# Patient Record
Sex: MALE | Race: WHITE | NOT HISPANIC OR LATINO
[De-identification: names, ages, dates, MRNs, and addresses within clinical notes are randomized per-mention and may not be internally consistent; named-entity substitution may affect disease eponyms.]

---

## 2017-10-16 ENCOUNTER — APPOINTMENT (OUTPATIENT)
Dept: ENDOCRINOLOGY | Facility: CLINIC | Age: 52
End: 2017-10-16
Payer: MEDICARE

## 2017-10-16 VITALS
SYSTOLIC BLOOD PRESSURE: 135 MMHG | HEART RATE: 65 BPM | DIASTOLIC BLOOD PRESSURE: 81 MMHG | WEIGHT: 303 LBS | BODY MASS INDEX: 43.38 KG/M2 | HEIGHT: 70 IN

## 2017-10-16 DIAGNOSIS — Z86.39 PERSONAL HISTORY OF OTHER ENDOCRINE, NUTRITIONAL AND METABOLIC DISEASE: ICD-10-CM

## 2017-10-16 DIAGNOSIS — Z86.79 PERSONAL HISTORY OF OTHER DISEASES OF THE CIRCULATORY SYSTEM: ICD-10-CM

## 2017-10-16 DIAGNOSIS — M17.10 UNILATERAL PRIMARY OSTEOARTHRITIS, UNSPECIFIED KNEE: ICD-10-CM

## 2017-10-16 PROCEDURE — 99204 OFFICE O/P NEW MOD 45 MIN: CPT | Mod: 25

## 2017-10-16 PROCEDURE — 36415 COLL VENOUS BLD VENIPUNCTURE: CPT

## 2017-11-02 ENCOUNTER — APPOINTMENT (OUTPATIENT)
Dept: ENDOCRINOLOGY | Facility: CLINIC | Age: 52
End: 2017-11-02

## 2017-11-03 ENCOUNTER — APPOINTMENT (OUTPATIENT)
Dept: ENDOCRINOLOGY | Facility: CLINIC | Age: 52
End: 2017-11-03
Payer: MEDICARE

## 2017-11-03 PROCEDURE — 90834 PSYTX W PT 45 MINUTES: CPT

## 2017-11-07 ENCOUNTER — APPOINTMENT (OUTPATIENT)
Dept: ENDOCRINOLOGY | Facility: CLINIC | Age: 52
End: 2017-11-07

## 2017-11-08 ENCOUNTER — APPOINTMENT (OUTPATIENT)
Dept: ENDOCRINOLOGY | Facility: CLINIC | Age: 52
End: 2017-11-08

## 2017-11-13 ENCOUNTER — APPOINTMENT (OUTPATIENT)
Dept: ENDOCRINOLOGY | Facility: CLINIC | Age: 52
End: 2017-11-13
Payer: MEDICARE

## 2017-11-13 PROCEDURE — 90834 PSYTX W PT 45 MINUTES: CPT

## 2017-11-15 ENCOUNTER — APPOINTMENT (OUTPATIENT)
Dept: ENDOCRINOLOGY | Facility: CLINIC | Age: 52
End: 2017-11-15

## 2017-11-22 ENCOUNTER — APPOINTMENT (OUTPATIENT)
Dept: ENDOCRINOLOGY | Facility: CLINIC | Age: 52
End: 2017-11-22

## 2017-11-27 ENCOUNTER — APPOINTMENT (OUTPATIENT)
Dept: ENDOCRINOLOGY | Facility: CLINIC | Age: 52
End: 2017-11-27
Payer: MEDICARE

## 2017-11-27 PROCEDURE — 90834 PSYTX W PT 45 MINUTES: CPT

## 2017-12-05 ENCOUNTER — APPOINTMENT (OUTPATIENT)
Dept: ENDOCRINOLOGY | Facility: CLINIC | Age: 52
End: 2017-12-05

## 2017-12-12 ENCOUNTER — APPOINTMENT (OUTPATIENT)
Dept: ENDOCRINOLOGY | Facility: CLINIC | Age: 52
End: 2017-12-12

## 2018-01-18 ENCOUNTER — APPOINTMENT (OUTPATIENT)
Dept: ENDOCRINOLOGY | Facility: CLINIC | Age: 53
End: 2018-01-18
Payer: MEDICARE

## 2018-01-18 VITALS
HEART RATE: 72 BPM | SYSTOLIC BLOOD PRESSURE: 118 MMHG | HEIGHT: 70 IN | WEIGHT: 298.5 LBS | BODY MASS INDEX: 42.73 KG/M2 | DIASTOLIC BLOOD PRESSURE: 67 MMHG

## 2018-01-18 LAB — TSH SERPL-ACNC: 1.52 UIU/ML

## 2018-01-18 PROCEDURE — 99214 OFFICE O/P EST MOD 30 MIN: CPT

## 2018-01-29 ENCOUNTER — APPOINTMENT (OUTPATIENT)
Dept: ENDOCRINOLOGY | Facility: CLINIC | Age: 53
End: 2018-01-29
Payer: MEDICARE

## 2018-01-29 PROCEDURE — 90834 PSYTX W PT 45 MINUTES: CPT

## 2018-02-08 ENCOUNTER — APPOINTMENT (OUTPATIENT)
Dept: ENDOCRINOLOGY | Facility: CLINIC | Age: 53
End: 2018-02-08

## 2018-02-15 ENCOUNTER — APPOINTMENT (OUTPATIENT)
Dept: ENDOCRINOLOGY | Facility: CLINIC | Age: 53
End: 2018-02-15
Payer: MEDICARE

## 2018-02-15 PROCEDURE — 90834 PSYTX W PT 45 MINUTES: CPT

## 2018-02-26 ENCOUNTER — APPOINTMENT (OUTPATIENT)
Dept: ENDOCRINOLOGY | Facility: CLINIC | Age: 53
End: 2018-02-26
Payer: MEDICARE

## 2018-02-26 VITALS
HEART RATE: 74 BPM | SYSTOLIC BLOOD PRESSURE: 118 MMHG | BODY MASS INDEX: 42.18 KG/M2 | WEIGHT: 294 LBS | DIASTOLIC BLOOD PRESSURE: 69 MMHG

## 2018-02-26 DIAGNOSIS — E66.9 OBESITY, UNSPECIFIED: ICD-10-CM

## 2018-02-26 PROCEDURE — 99214 OFFICE O/P EST MOD 30 MIN: CPT

## 2018-03-09 LAB
ALBUMIN SERPL ELPH-MCNC: 4.6 G/DL
ALP BLD-CCNC: 73 U/L
ALT SERPL-CCNC: 18 U/L
ANION GAP SERPL CALC-SCNC: 17 MMOL/L
AST SERPL-CCNC: 19 U/L
BILIRUB SERPL-MCNC: 0.5 MG/DL
BUN SERPL-MCNC: 8 MG/DL
CALCIUM SERPL-MCNC: 10.1 MG/DL
CHLORIDE SERPL-SCNC: 100 MMOL/L
CHOLEST SERPL-MCNC: 184 MG/DL
CHOLEST/HDLC SERPL: 2.6 RATIO
CO2 SERPL-SCNC: 29 MMOL/L
CREAT SERPL-MCNC: 1.04 MG/DL
CREAT SPEC-SCNC: 94 MG/DL
GLUCOSE SERPL-MCNC: 111 MG/DL
HBA1C MFR BLD HPLC: 6.2 %
HDLC SERPL-MCNC: 71 MG/DL
LDLC SERPL CALC-MCNC: 103 MG/DL
MICROALBUMIN 24H UR DL<=1MG/L-MCNC: 3 MG/DL
MICROALBUMIN/CREAT 24H UR-RTO: 32 MG/G
POTASSIUM SERPL-SCNC: 3.9 MMOL/L
PROT SERPL-MCNC: 7.4 G/DL
SODIUM SERPL-SCNC: 146 MMOL/L
TRIGL SERPL-MCNC: 51 MG/DL
TSH SERPL-ACNC: 0.86 UIU/ML

## 2018-03-22 ENCOUNTER — APPOINTMENT (OUTPATIENT)
Dept: ENDOCRINOLOGY | Facility: CLINIC | Age: 53
End: 2018-03-22

## 2018-03-26 ENCOUNTER — APPOINTMENT (OUTPATIENT)
Dept: ENDOCRINOLOGY | Facility: CLINIC | Age: 53
End: 2018-03-26

## 2018-04-16 ENCOUNTER — APPOINTMENT (OUTPATIENT)
Dept: ENDOCRINOLOGY | Facility: CLINIC | Age: 53
End: 2018-04-16
Payer: MEDICARE

## 2018-04-16 VITALS
HEIGHT: 70 IN | SYSTOLIC BLOOD PRESSURE: 133 MMHG | BODY MASS INDEX: 41.23 KG/M2 | DIASTOLIC BLOOD PRESSURE: 83 MMHG | WEIGHT: 288 LBS | HEART RATE: 82 BPM

## 2018-04-16 DIAGNOSIS — R73.09 OTHER ABNORMAL GLUCOSE: ICD-10-CM

## 2018-04-16 PROCEDURE — 99213 OFFICE O/P EST LOW 20 MIN: CPT

## 2018-04-20 ENCOUNTER — APPOINTMENT (OUTPATIENT)
Dept: OTOLARYNGOLOGY | Facility: CLINIC | Age: 53
End: 2018-04-20
Payer: MEDICARE

## 2018-04-20 VITALS
SYSTOLIC BLOOD PRESSURE: 126 MMHG | DIASTOLIC BLOOD PRESSURE: 77 MMHG | WEIGHT: 288 LBS | HEIGHT: 70 IN | HEART RATE: 86 BPM | BODY MASS INDEX: 41.23 KG/M2

## 2018-04-20 DIAGNOSIS — F41.9 ANXIETY DISORDER, UNSPECIFIED: ICD-10-CM

## 2018-04-20 DIAGNOSIS — G47.33 OBSTRUCTIVE SLEEP APNEA (ADULT) (PEDIATRIC): ICD-10-CM

## 2018-04-20 DIAGNOSIS — J34.2 DEVIATED NASAL SEPTUM: ICD-10-CM

## 2018-04-20 DIAGNOSIS — F32.9 ANXIETY DISORDER, UNSPECIFIED: ICD-10-CM

## 2018-04-20 PROCEDURE — 31231 NASAL ENDOSCOPY DX: CPT

## 2018-04-20 PROCEDURE — 99214 OFFICE O/P EST MOD 30 MIN: CPT | Mod: 25

## 2018-04-20 RX ORDER — MOMETASONE 50 UG/1
50 SPRAY, METERED NASAL TWICE DAILY
Qty: 6 | Refills: 3 | Status: ACTIVE | COMMUNITY
Start: 2018-04-20 | End: 1900-01-01

## 2018-04-20 RX ORDER — DULOXETINE HYDROCHLORIDE 30 MG/1
30 CAPSULE, DELAYED RELEASE ORAL
Refills: 0 | Status: ACTIVE | COMMUNITY

## 2018-04-20 RX ORDER — METFORMIN HYDROCHLORIDE 1000 MG/1
1000 TABLET, COATED ORAL
Qty: 180 | Refills: 3 | Status: DISCONTINUED | COMMUNITY
Start: 2018-02-26 | End: 2018-04-20

## 2018-04-21 PROBLEM — F41.9 ANXIETY AND DEPRESSION: Status: ACTIVE | Noted: 2018-04-21

## 2018-05-15 ENCOUNTER — RX RENEWAL (OUTPATIENT)
Age: 53
End: 2018-05-15

## 2018-05-15 ENCOUNTER — APPOINTMENT (OUTPATIENT)
Dept: ENDOCRINOLOGY | Facility: CLINIC | Age: 53
End: 2018-05-15
Payer: MEDICARE

## 2018-05-15 VITALS
DIASTOLIC BLOOD PRESSURE: 60 MMHG | WEIGHT: 285 LBS | SYSTOLIC BLOOD PRESSURE: 108 MMHG | BODY MASS INDEX: 40.89 KG/M2 | HEART RATE: 75 BPM

## 2018-05-15 DIAGNOSIS — R73.03 PREDIABETES.: ICD-10-CM

## 2018-05-15 PROCEDURE — 82962 GLUCOSE BLOOD TEST: CPT

## 2018-05-15 PROCEDURE — 99211 OFF/OP EST MAY X REQ PHY/QHP: CPT | Mod: 25

## 2018-05-15 RX ORDER — EXENATIDE 250 UG/ML
5 INJECTION SUBCUTANEOUS
Qty: 6 | Refills: 3 | Status: ACTIVE | COMMUNITY
Start: 2018-05-15 | End: 1900-01-01

## 2018-05-22 ENCOUNTER — OTHER (OUTPATIENT)
Age: 53
End: 2018-05-22

## 2018-05-23 ENCOUNTER — APPOINTMENT (OUTPATIENT)
Dept: PULMONOLOGY | Facility: CLINIC | Age: 53
End: 2018-05-23

## 2018-06-12 ENCOUNTER — APPOINTMENT (OUTPATIENT)
Dept: ENDOCRINOLOGY | Facility: CLINIC | Age: 53
End: 2018-06-12

## 2018-06-20 ENCOUNTER — APPOINTMENT (OUTPATIENT)
Dept: OTOLARYNGOLOGY | Facility: CLINIC | Age: 53
End: 2018-06-20

## 2018-08-30 RX ORDER — PEN NEEDLE, DIABETIC 29 G X1/2"
32G X 4 MM NEEDLE, DISPOSABLE MISCELLANEOUS
Qty: 1 | Refills: 2 | Status: ACTIVE | COMMUNITY
Start: 2018-05-15 | End: 1900-01-01

## 2021-04-26 ENCOUNTER — APPOINTMENT (OUTPATIENT)
Dept: VASCULAR SURGERY | Facility: CLINIC | Age: 56
End: 2021-04-26

## 2021-06-18 ENCOUNTER — APPOINTMENT (OUTPATIENT)
Dept: OTOLARYNGOLOGY | Facility: CLINIC | Age: 56
End: 2021-06-18
Payer: MEDICARE

## 2021-06-18 VITALS
WEIGHT: 280 LBS | HEIGHT: 70 IN | BODY MASS INDEX: 40.09 KG/M2 | TEMPERATURE: 97.3 F | HEART RATE: 68 BPM | SYSTOLIC BLOOD PRESSURE: 120 MMHG | DIASTOLIC BLOOD PRESSURE: 74 MMHG

## 2021-06-18 PROCEDURE — 99204 OFFICE O/P NEW MOD 45 MIN: CPT

## 2021-07-28 RX ORDER — AMOXICILLIN AND CLAVULANATE POTASSIUM 875; 125 MG/1; MG/1
875-125 TABLET, COATED ORAL
Qty: 42 | Refills: 0 | Status: COMPLETED | COMMUNITY
Start: 2018-04-20 | End: 2018-05-11

## 2021-07-28 RX ORDER — PREDNISONE 20 MG/1
20 TABLET ORAL DAILY
Qty: 3 | Refills: 0 | Status: COMPLETED | COMMUNITY
Start: 2018-04-20 | End: 2018-04-23

## 2021-07-28 NOTE — REVIEW OF SYSTEMS
[Patient Intake Form Reviewed] : Patient intake form was reviewed [As Noted in HPI] : as noted in HPI [Eyesight Problems] : eyesight problems [Vomiting] : vomiting [Joint Pain] : joint pain [Joint Swelling] : joint swelling [Joint Stiffness] : joint stiffness [Negative] : Endocrine [FreeTextEntry5] : heart murmur [de-identified] : imbalance [de-identified] : blood clots, anemia

## 2021-07-28 NOTE — ASSESSMENT
[FreeTextEntry1] : Mr. AKERS was evaluated for the following issues today:\par \par 1.) Nasal drainage thin, watery is chronic, likely just due to rhinitis\par --> start ipratropium bromide\par --> collect fluid to r/o CSF leak\par \par 2.) Nasal congestion chronic when lying down.  Inferior turbinates are small, so he may need more thick moisture in nose\par --> start saline gel spray\par \par Return in 1 month

## 2021-07-28 NOTE — DATA REVIEWED
[de-identified] : \par MRI BRAIN WITHOUT CONTRAST (12-) [facility]:\par -- The lateral and third ventricles and the cortical sulci are within normal limits in size and configuration for age. There is no evidence of a supratentorial mass or extra-axial fluid collection. No hemorrhages, cortical infarctions, acute infarcts or restricted diffusion are seen within the cerebral hemispheres bilaterally.\par -- In the posterior fossa, there are no abnormalities seen within the brainstem or cerebellum. The fourth ventricle is midline and within normal limits in size and configuration. No abnormalities are seen in the cerebellopontine angle cisterns or IACs bilaterally. The craniocervical junction is unremarkable.\par -- Normal flow void is seen within the skull base vessels. No significant abnormalities are seen within the orbits. \par -- Mild mucosal thickening with possible small air-fluid levels are noted within the maxillary antra compatible with mild inflammatory changes. Minimal mucosal thickening is seen within the ethmoid sinuses.\par

## 2021-07-28 NOTE — HISTORY OF PRESENT ILLNESS
[de-identified] : Mr. AKERS presents for nasal congestion\par Breathing is worse when he is lying down to sleep.\par About 2 weeks ago, after irrigation to right nasal cavity, he couldn’t breathing through right side until he coughed out a large mucus plug.  He is taking fluticasone nasal spray.\par He has thin clear rhinorrhea and postnasal drip.  He gets temporal headaches.\par S/p revision septoplasty, inferior turbinate reduction and endoscopic sinus surgery (maxillary, ethmoid, frontal) in 2016.\par Last seen in April 2018 and treated for acute sinusitis (air-fluid level on MRI brain 12/2017).\par

## 2021-07-28 NOTE — PROCEDURE
[FreeTextEntry6] : \par Indication: chronic sinusitis\par -Verbal consent was obtained from patient prior to exam. \par - Cristian-Synephrine and lidocaine 2% spray applied to nose bilaterally.\par Nasal endoscopy was performed with flexible scope.\par Findings: \par -- Inferior turbinates are small bilateral\par -- Septum was mildly deviated to the left\par -- Middle turbinates normal bilateral\par -- Middle meatus clear bilateral. Bilateral maxillary and ethmoid sinuses open. No d/c or polyps seen.\par -- Nasopharynx without mass or exudate\par -- Adenoids were absent\par -- Eustachian orifices were clear bilateral\par \par The patient tolerated the procedure well.\par

## 2021-07-28 NOTE — PHYSICAL EXAM
[Nasal Endoscopy Performed] : nasal endoscopy was performed, see procedure section for findings [Midline] : trachea located in midline position [Normal] : no rashes [de-identified] : Carotid pulses 2+ bilateral.

## 2021-08-04 ENCOUNTER — LABORATORY RESULT (OUTPATIENT)
Age: 56
End: 2021-08-04

## 2021-08-04 ENCOUNTER — APPOINTMENT (OUTPATIENT)
Dept: OTOLARYNGOLOGY | Facility: CLINIC | Age: 56
End: 2021-08-04
Payer: MEDICARE

## 2021-08-04 VITALS
WEIGHT: 285 LBS | HEART RATE: 71 BPM | TEMPERATURE: 97.3 F | HEIGHT: 70 IN | SYSTOLIC BLOOD PRESSURE: 131 MMHG | DIASTOLIC BLOOD PRESSURE: 77 MMHG | BODY MASS INDEX: 40.8 KG/M2

## 2021-08-04 DIAGNOSIS — J34.89 OTHER SPECIFIED DISORDERS OF NOSE AND NASAL SINUSES: ICD-10-CM

## 2021-08-04 DIAGNOSIS — J32.8 OTHER CHRONIC SINUSITIS: ICD-10-CM

## 2021-08-04 PROCEDURE — 99214 OFFICE O/P EST MOD 30 MIN: CPT

## 2021-08-10 RX ORDER — IPRATROPIUM BROMIDE 42 UG/1
0.06 SPRAY NASAL
Qty: 1 | Refills: 2 | Status: DISCONTINUED | COMMUNITY
Start: 2021-06-18 | End: 2021-08-04

## 2021-08-10 NOTE — ASSESSMENT
[FreeTextEntry1] : Mr. AKERS was evaluated for the following issues today:\par \par 1.) Nasal crusting on left side.  Dryness in nose interferes with satisfactory CPAP use, despite humidifier.\par --> apply Aquaphor to nose before he goes to bed\par \par 2.) Nasal drainage has been collected to r/o CSF rhinorrhea; no change after ipratropium nasal spray.\par --> we will call him with results of sample testing\par --> we will treat with antibiotics and CT sinus if it is negative for CSF\par --> If positive for CSF, we will get a CT sinus\par \par 3.) Turbinate hypertrophy is mild, s/p SMR inferior turbinates in 2016\par --> He may benefit from turbinate reduction of the left turbinate\par \par Return after test results

## 2021-08-10 NOTE — PHYSICAL EXAM
[Midline] : trachea located in midline position [] : septum deviated to the left [Normal] : no neck adenopathy [FreeTextEntry1] : No hoarseness.  [de-identified] : s/p SMR inferior turbinates bilateral; slightly larger on right side. [de-identified] : Crusting in left nasal vestibule.  Nasal passages are open but left slightly more narrow than right.

## 2021-08-10 NOTE — CONSULT LETTER
[Dear  ___] : Dear  [unfilled], [Courtesy Letter:] : I had the pleasure of seeing your patient, [unfilled], in my office today. [Please see my note below.] : Please see my note below. [Consult Closing:] : Thank you very much for allowing me to participate in the care of this patient.  If you have any questions, please do not hesitate to contact me. [Sincerely,] : Sincerely, [FreeTextEntry2] : Judy Ramirez M.D.\par [address] [place] [address]\[place] [address]\par  [FreeTextEntry3] : \par Martita Estes MD \par Otolaryngology, Head and Neck Surgery \par \par

## 2021-08-10 NOTE — HISTORY OF PRESENT ILLNESS
[de-identified] : Mr. AKERS is still having clear nasal drainage, not decreased by ipratropium nasal spray.\par He was able to collect fluid from his nose over a 2 week period for beta-transferrin testing. \par Still having nasal congestion and thick drainage.  The right nostril gets more congested than the left when he lies down.\par He is still having issues sleeping due to nasal congestion because of discomfort with the CPAP.\par S/p revision septoplasty, inferior turbinate reduction and endoscopic sinus surgery (maxillary, ethmoid, frontal) in 2016.\par \par VISIT 6/18/2021:\par Mr. AKERS c/o nasal congestion -- nasal breathing is worse when he is lying down to sleep.\par About 2 weeks ago, after irrigation to right nasal cavity, he couldn’t breathe through right side until he coughed out a large mucus plug. He is taking fluticasone nasal spray.\par He has thin clear rhinorrhea and postnasal drip. He gets temporal headaches.\par S/p revision septoplasty, inferior turbinate reduction and endoscopic sinus surgery (maxillary, ethmoid, frontal) in 2016.\par Last seen in April 2018 and treated for acute sinusitis (air-fluid level on MRI brain 12/2017).\par

## 2021-08-12 ENCOUNTER — APPOINTMENT (OUTPATIENT)
Dept: OTOLARYNGOLOGY | Facility: CLINIC | Age: 56
End: 2021-08-12

## 2021-08-19 ENCOUNTER — OUTPATIENT (OUTPATIENT)
Dept: OUTPATIENT SERVICES | Facility: HOSPITAL | Age: 56
LOS: 1 days | End: 2021-08-19

## 2021-08-19 ENCOUNTER — RESULT REVIEW (OUTPATIENT)
Age: 56
End: 2021-08-19

## 2021-08-19 ENCOUNTER — APPOINTMENT (OUTPATIENT)
Dept: CT IMAGING | Facility: CLINIC | Age: 56
End: 2021-08-19
Payer: MEDICARE

## 2021-08-19 DIAGNOSIS — Z41.9 ENCOUNTER FOR PROCEDURE FOR PURPOSES OTHER THAN REMEDYING HEALTH STATE, UNSPECIFIED: Chronic | ICD-10-CM

## 2021-08-19 DIAGNOSIS — Z96.651 PRESENCE OF RIGHT ARTIFICIAL KNEE JOINT: Chronic | ICD-10-CM

## 2021-08-19 PROCEDURE — 70486 CT MAXILLOFACIAL W/O DYE: CPT | Mod: 26,MH

## 2021-09-15 ENCOUNTER — APPOINTMENT (OUTPATIENT)
Dept: OTOLARYNGOLOGY | Facility: CLINIC | Age: 56
End: 2021-09-15
Payer: MEDICARE

## 2021-09-15 VITALS
HEART RATE: 76 BPM | TEMPERATURE: 97.7 F | HEIGHT: 70 IN | SYSTOLIC BLOOD PRESSURE: 148 MMHG | DIASTOLIC BLOOD PRESSURE: 91 MMHG

## 2021-09-15 LAB — SARS-COV-2 N GENE NPH QL NAA+PROBE: NOT DETECTED

## 2021-09-15 PROCEDURE — 30802 ABLATE INF TURBINATE SUBMUC: CPT

## 2021-10-15 ENCOUNTER — APPOINTMENT (OUTPATIENT)
Dept: OTOLARYNGOLOGY | Facility: CLINIC | Age: 56
End: 2021-10-15
Payer: MEDICARE

## 2021-10-15 VITALS
HEART RATE: 68 BPM | HEIGHT: 70 IN | TEMPERATURE: 97.3 F | SYSTOLIC BLOOD PRESSURE: 119 MMHG | DIASTOLIC BLOOD PRESSURE: 79 MMHG | WEIGHT: 281 LBS | BODY MASS INDEX: 40.23 KG/M2

## 2021-10-15 PROCEDURE — 99213 OFFICE O/P EST LOW 20 MIN: CPT

## 2021-10-15 RX ORDER — AMOXICILLIN AND CLAVULANATE POTASSIUM 875; 125 MG/1; MG/1
875-125 TABLET, COATED ORAL
Qty: 28 | Refills: 0 | Status: COMPLETED | COMMUNITY
Start: 2021-08-10 | End: 2021-08-24

## 2021-10-15 NOTE — HISTORY OF PRESENT ILLNESS
[de-identified] : Mr. AKERS is s/p left inferior turbinate submucosal ablation for nightly congestion on 9/15/2021.\par He reports that breathing on the left is slightly better but does not match the comfortable breathing on the right.\par The nasal drainage was negative for beta2 transferrin (not CSF).\par \par VISIT 8/04/2021\par He is still having clear nasal drainage, not decreased by ipratropium nasal spray.\par He was able to collect fluid from his nose over a 2 week period for beta-transferrin testing. \par Still having nasal congestion and thick drainage.  The right nostril gets more congested than the left when he lies down.\par He is still having issues sleeping due to nasal congestion because of discomfort with the CPAP.\par S/p revision septoplasty, inferior turbinate reduction and endoscopic sinus surgery (maxillary, ethmoid, frontal) in 2016.\par \par VISIT 6/18/2021:\par Mr. AKERS c/o nasal congestion -- nasal breathing is worse when he is lying down to sleep.\par About 2 weeks ago, after irrigation to right nasal cavity, he couldn’t breathe through right side until he coughed out a large mucus plug. He is taking fluticasone nasal spray.\par He has thin clear rhinorrhea and postnasal drip. He gets temporal headaches.\par S/p revision septoplasty, inferior turbinate reduction and endoscopic sinus surgery (maxillary, ethmoid, frontal) in 2016.\par Last seen in April 2018 and treated for acute sinusitis (air-fluid level on MRI brain 12/2017).\par

## 2021-10-15 NOTE — PHYSICAL EXAM
[FreeTextEntry1] : No hoarseness.  [] : septum deviated to the left [de-identified] : s/p SMR inferior turbinates bilateral.  Nasal airways are patent now.  Slighlty larger posterior component of left inferior turbinate/ [Midline] : trachea located in midline position [Normal] : no neck adenopathy

## 2021-10-15 NOTE — CONSULT LETTER
[Dear  ___] : Dear  [unfilled], [Courtesy Letter:] : I had the pleasure of seeing your patient, [unfilled], in my office today. [Please see my note below.] : Please see my note below. [Consult Closing:] : Thank you very much for allowing me to participate in the care of this patient.  If you have any questions, please do not hesitate to contact me. [Sincerely,] : Sincerely, [FreeTextEntry2] : Judy Ramirez M.D.\par [address] [place] [address]\[place] [address]\par  [FreeTextEntry3] : \par Martita Estes MD \par Otolaryngology, Head and Neck Surgery \par \par

## 2021-10-15 NOTE — ASSESSMENT
[FreeTextEntry1] : PROCEDURE: left  inferior turbinate submucosal ablation/reduction \par \par PREOPERATIVE DIAGNOSIS:  left inferior turbinate enlargement.\par \par POSTOPERATIVE DIAGNOSIS: left  inferior turbinate enlargement.\par \par ESTIMATED BLOOD LOSS:  Minimal.\par COMPLICATIONS:  None.\par \par INDICATIONS:  \par Mr. Ceballos has chronic left nasal congestion when he lies down, not relieved with nasal steroid spray.  He is s/p septoplasty and SMR inferior turbinates in 2016.\par I recommended left inferior turbinate reduction via submucosal ablation.  Risks, benefits, and alternatives were discussed with the patient who agreed to proceed.  Risks include, but are not limited to, bleeding, infection, worsening or persistent difficulty breathing, and intranasal scarring.  He understands that he may require additional reduction treatment if he is not satisfied with the level of breathing after the turbinate heals.\par \par PROCEDURE:  After informed consent was obtained and timeout protocol was performed, the left nasal cavity was topicalized with pledgets soaked in 4% lidocaine solution.   The left inferior turbinate was also injected with 2% lidocaine for anesthesia. There was minimal bleeding.  Once the left inferior turbinate were completely anesthetized, the medial submucosal side of the left inferior turbinate was cauterized with the Celon wand in increments from posterior to anterior.  The inferior turbinate was then outfractured with a [place].  There was minimal bleeding.  Gelfoam coated in mupirocin was placed in the left nasal cavity.\par \par The patient tolerated the procedure well.

## 2021-10-15 NOTE — HISTORY OF PRESENT ILLNESS
[de-identified] : Mr. AKERS presents for left inferior turbinate reduction.\par Covid test: negative

## 2021-10-15 NOTE — ASSESSMENT
[FreeTextEntry1] : Mr. AKERS was evaluated for the following issues today:\par \par 1.) Nasal drainage is not as bad.  Sample collected in August was negative for beta-2 transferrin, so negative for CSF rhinorrhea\par \par 3.) Turbinate hypertrophy is mild, s/p SMR inferior turbinates in 2016 and s/p submucus ablation of left IT 9/2021.\par He still feels like left side breathing is not as good as on right\par --> Live with the current turbinate.\par Reassess in a month re breathing.  He is a candidate for a little more inferior turbinate reduction.  I explained that the inferior turbinate should not be made too small, or he will feel like breathing is worse.\par \par Telephone f/up in 1 month\par

## 2021-11-18 ENCOUNTER — APPOINTMENT (OUTPATIENT)
Dept: OTOLARYNGOLOGY | Facility: CLINIC | Age: 56
End: 2021-11-18
Payer: MEDICARE

## 2021-11-18 DIAGNOSIS — J34.3 HYPERTROPHY OF NASAL TURBINATES: ICD-10-CM

## 2021-11-18 DIAGNOSIS — G96.01 CRANIAL CEREBROSPINAL FLUID LEAK, SPONTANEOUS: ICD-10-CM

## 2021-11-18 DIAGNOSIS — R09.81 NASAL CONGESTION: ICD-10-CM

## 2021-11-18 DIAGNOSIS — J34.89 OTHER SPECIFIED DISORDERS OF NOSE AND NASAL SINUSES: ICD-10-CM

## 2021-11-18 DIAGNOSIS — J33.9 NASAL POLYP, UNSPECIFIED: ICD-10-CM

## 2021-11-18 PROCEDURE — 99442: CPT | Mod: 95

## 2021-11-18 RX ORDER — FLUTICASONE PROPIONATE 93 UG/1
93 SPRAY, METERED NASAL
Qty: 1 | Refills: 3 | Status: ACTIVE | COMMUNITY
Start: 2021-11-18 | End: 1900-01-01

## 2021-12-29 PROBLEM — R09.81 NASAL CONGESTION: Status: ACTIVE | Noted: 2021-06-22

## 2021-12-29 PROBLEM — J34.3 HYPERTROPHY OF BOTH INFERIOR NASAL TURBINATES: Status: RESOLVED | Noted: 2018-04-21 | Resolved: 2021-12-29

## 2021-12-29 PROBLEM — G96.01 CSF RHINORRHEA: Status: RESOLVED | Noted: 2021-08-04 | Resolved: 2021-12-29

## 2021-12-29 PROBLEM — J34.3 HYPERTROPHY OF INFERIOR NASAL TURBINATE: Status: ACTIVE | Noted: 2021-12-29

## 2021-12-29 PROBLEM — J34.89 NASAL DRAINAGE: Status: ACTIVE | Noted: 2021-06-18

## 2021-12-29 NOTE — HISTORY OF PRESENT ILLNESS
[de-identified] : TELEPHONIC VISIT\par  Visit initiated at patient request. This audio visit is occurring during the  state of emergency due to COVID-19. Governmental regulation is restricting travel, in-person contact, recommend use of remote activities and telemedicine whenever possible. I discussed with patient the limitations of telemedicine encounters, including risks associated with the technology platform, technical difficulties, data security, and no physical exam. The patient may need further testing and workup to arrive at a diagnosis and treatment plan. We discussed this will be billed as a visit. \par Mr. AKERS  understood and elected to proceed at  11AM on NOV 18, 2021 \par Patient location: Home  in [place], [place].  Patient was the only participant.\par Physician location:  Office of Dr. Estes at [address] [place] [address] in [place], [place]\par ----------------------------------------------------------------------------------------\par ---------------------------------------------------------------------------------------- \par No change in sx -- Left nasal congestion when lying down; right nasal breathing is fine.\par Rhinorrhea is a lot when he wakes up the AMs for 2 hours over past 2 weeks - feels like he has allergies.  Ipratropium nasal spray does not help.  Nasal rinses clean the nose but doesn’t stop the drainage.  During day, his nose is dry now.\par The nasal drainage was negative for beta2 transferrin in Aug 2021.\par When sleeps with CPAP with auto temp control, the drainage starts and he starts to choke.\par S/p left inferior turbinate submucosal ablation for nightly congestion on 9/15/2021.\par \par VISIT 10/15/2021\par Mr. AKERS is s/p left inferior turbinate submucosal ablation for nightly congestion on 9/15/2021.\par He reports that breathing on the left is slightly better but does not match the comfortable breathing on the right.\par The nasal drainage was negative for beta2 transferrin (not CSF).\par \par VISIT 8/04/2021\par He is still having clear nasal drainage, not decreased by ipratropium nasal spray.\par He was able to collect fluid from his nose over a 2 week period for beta-transferrin testing. \par Still having nasal congestion and thick drainage. The right nostril gets more congested than the left when he lies down.\par He is still having issues sleeping due to nasal congestion because of discomfort with the CPAP.\par S/p revision septoplasty, inferior turbinate reduction and endoscopic sinus surgery (maxillary, ethmoid, frontal) in 2016.\par \par VISIT 6/18/2021:\par Mr. AKERS c/o nasal congestion -- nasal breathing is worse when he is lying down to sleep.\par About 2 weeks ago, after irrigation to right nasal cavity, he couldn’t breathe through right side until he coughed out a large mucus plug. He is taking fluticasone nasal spray.\par He has thin clear rhinorrhea and postnasal drip. He gets temporal headaches.\par S/p revision septoplasty, inferior turbinate reduction and endoscopic sinus surgery (maxillary, ethmoid, frontal) in 2016.\par Last seen in April 2018 and treated for acute sinusitis (air-fluid level on MRI brain 12/2017).\par \par  \par

## 2021-12-29 NOTE — ASSESSMENT
[FreeTextEntry1] : Mr. AKERS was evaluated for the following issues today:\par \par 1.) Nasal drainage is persistent but not as bad.  Sample collected in August was negative for beta-2 transferrin.\par \par 3.) Turbinate hypertrophy is mild, s/p SMR inferior turbinates in 2016 and s/p submucus ablation of left IT 9/2021.\par He still reports let nasal congestion when he lies down.\par  He is a candidate for a little more inferior turbinate reduction but I have to be careful that it does not become too small, or he will feel like breathing is worse.\par \par He will consider and schedule procedure as needed.\par \par

## 2021-12-29 NOTE — CONSULT LETTER
[Dear  ___] : Dear  [unfilled], [Courtesy Letter:] : I had the pleasure of seeing your patient, [unfilled], in my office today. [Please see my note below.] : Please see my note below. [Consult Closing:] : Thank you very much for allowing me to participate in the care of this patient.  If you have any questions, please do not hesitate to contact me. [Sincerely,] : Sincerely, [FreeTextEntry2] : Judy Ramirez M.D.\par [address] [place] [address]\[place] [address]\par  [FreeTextEntry3] : \par Martita Estes MD \par Otolaryngology, Head and Neck Surgery \par \par

## 2024-09-19 ENCOUNTER — NON-APPOINTMENT (OUTPATIENT)
Age: 59
End: 2024-09-19

## 2024-09-20 ENCOUNTER — APPOINTMENT (OUTPATIENT)
Dept: OTOLARYNGOLOGY | Facility: CLINIC | Age: 59
End: 2024-09-20
Payer: MEDICARE

## 2024-09-20 VITALS
SYSTOLIC BLOOD PRESSURE: 120 MMHG | WEIGHT: 248 LBS | HEIGHT: 70 IN | BODY MASS INDEX: 35.5 KG/M2 | DIASTOLIC BLOOD PRESSURE: 83 MMHG | TEMPERATURE: 98.2 F | HEART RATE: 98 BPM

## 2024-09-20 DIAGNOSIS — Z87.09 PERSONAL HISTORY OF OTHER DISEASES OF THE RESPIRATORY SYSTEM: ICD-10-CM

## 2024-09-20 DIAGNOSIS — J34.3 HYPERTROPHY OF NASAL TURBINATES: ICD-10-CM

## 2024-09-20 DIAGNOSIS — J33.9 NASAL POLYP, UNSPECIFIED: ICD-10-CM

## 2024-09-20 DIAGNOSIS — J34.89 OTHER SPECIFIED DISORDERS OF NOSE AND NASAL SINUSES: ICD-10-CM

## 2024-09-20 DIAGNOSIS — R09.81 NASAL CONGESTION: ICD-10-CM

## 2024-09-20 DIAGNOSIS — G47.33 OBSTRUCTIVE SLEEP APNEA (ADULT) (PEDIATRIC): ICD-10-CM

## 2024-09-20 PROCEDURE — 99214 OFFICE O/P EST MOD 30 MIN: CPT | Mod: 25

## 2024-09-20 PROCEDURE — 31231 NASAL ENDOSCOPY DX: CPT

## 2024-09-24 PROBLEM — J34.89 NASAL CRUSTING: Status: RESOLVED | Noted: 2021-08-04 | Resolved: 2024-09-24

## 2024-09-24 PROBLEM — Z87.09 HISTORY OF NASAL DISCHARGE: Status: RESOLVED | Noted: 2021-06-18 | Resolved: 2024-09-24

## 2024-09-24 RX ORDER — TRIAMCINOLONE ACETONIDE 1 MG/G
0.1 CREAM TOPICAL
Qty: 30 | Refills: 0 | Status: ACTIVE | COMMUNITY
Start: 2024-09-11

## 2024-09-24 RX ORDER — NYSTATIN TOPICAL POWDER 100000 U/G
100000 POWDER TOPICAL
Qty: 60 | Refills: 0 | Status: ACTIVE | COMMUNITY
Start: 2024-07-11

## 2024-09-24 RX ORDER — ALUMINUM CHLORIDE 20 %
20 SOLUTION, NON-ORAL TOPICAL
Qty: 35 | Refills: 0 | Status: ACTIVE | COMMUNITY
Start: 2024-07-11

## 2024-09-24 RX ORDER — PREGABALIN 75 MG/1
75 CAPSULE ORAL
Qty: 60 | Refills: 0 | Status: ACTIVE | COMMUNITY
Start: 2024-04-24

## 2024-09-24 RX ORDER — FLUTICASONE PROPIONATE 50 UG/1
50 SPRAY, METERED NASAL
Qty: 1 | Refills: 3 | Status: ACTIVE | COMMUNITY
Start: 2024-09-20 | End: 1900-01-01

## 2024-09-24 RX ORDER — AMOXICILLIN 500 MG/1
500 TABLET, FILM COATED ORAL
Qty: 4 | Refills: 0 | Status: COMPLETED | COMMUNITY
Start: 2024-07-29

## 2024-09-24 RX ORDER — GLYCOPYRROLATE 2 MG/1
2 TABLET ORAL
Qty: 60 | Refills: 0 | Status: COMPLETED | COMMUNITY
Start: 2024-02-05

## 2024-09-24 RX ORDER — TIZANIDINE 2 MG/1
2 TABLET ORAL
Qty: 30 | Refills: 0 | Status: ACTIVE | COMMUNITY
Start: 2024-04-15

## 2024-09-24 RX ORDER — SEMAGLUTIDE 2.68 MG/ML
8 INJECTION, SOLUTION SUBCUTANEOUS
Qty: 9 | Refills: 0 | Status: ACTIVE | COMMUNITY
Start: 2024-08-30

## 2024-09-24 RX ORDER — LISINOPRIL 20 MG/1
20 TABLET ORAL
Qty: 90 | Refills: 0 | Status: ACTIVE | COMMUNITY
Start: 2024-09-02

## 2024-09-24 RX ORDER — OXYCODONE 10 MG/1
10 TABLET ORAL
Qty: 150 | Refills: 0 | Status: COMPLETED | COMMUNITY
Start: 2024-04-24

## 2024-09-24 RX ORDER — ROSUVASTATIN CALCIUM 5 MG/1
5 TABLET, FILM COATED ORAL
Qty: 90 | Refills: 0 | Status: ACTIVE | COMMUNITY
Start: 2023-10-12

## 2024-09-24 RX ORDER — DOCUSATE SODIUM 100 MG/1
100 CAPSULE, LIQUID FILLED ORAL
Qty: 60 | Refills: 0 | Status: ACTIVE | COMMUNITY
Start: 2024-03-22

## 2024-09-24 RX ORDER — LISINOPRIL 10 MG/1
10 TABLET ORAL
Qty: 90 | Refills: 0 | Status: COMPLETED | COMMUNITY
Start: 2023-11-27

## 2024-09-24 NOTE — HISTORY OF PRESENT ILLNESS
[de-identified] : Mr. AKERS reports that he lost 60 lbs and has less snoring.  CPAP pressure decreased from 20 to 12 cm H2O requirement. However, he only sleeps about 4 hours/night.  He thinks that the CPAP may need further adjustment.  He is looing for a new sleep medicine doctor. He still has congestion in left side nose when lying down.   Not using any nasal spray at this time.  Hx nasal polyps. S/p revision septoplasty, inferior turbinate reduction and endoscopic sinus surgery (maxillary, ethmoid, frontal) in 2016. S/p left inferior turbinate submucosal ablation for nightly congestion on 9/15/2021.

## 2024-09-24 NOTE — ASSESSMENT
[FreeTextEntry1] : Mr. AKERS was evaluated for the following issues today:  1.) JOHNNA, improved after 60-lb weight loss but still only able to sleep 4 hours/night. On CPAP, now at 12 cm H2O, not 20. Needs new sleep medicine doctor. --> referral to Dr. Luong  2.) persistent left nasal congestion when in dependent position.  inferior turbinate could be reduced more. --> fluticasone nasal spray at bedtime Reassess in a month.  3.) Hx of nasal polyps, s/p sinus surgery in 2026 - no polyps seen.  Sinus cavities are open postsurgical  Telephone f/up in 4 weeks

## 2024-09-24 NOTE — PHYSICAL EXAM
[Nasal Endoscopy Performed] : nasal endoscopy was performed, see procedure section for findings [Normal] : no neck adenopathy [FreeTextEntry1] : No hoarseness.

## 2024-09-24 NOTE — CONSULT LETTER
[Dear  ___] : Dear  [unfilled], [Courtesy Letter:] : I had the pleasure of seeing your patient, [unfilled], in my office today. [Please see my note below.] : Please see my note below. [Consult Closing:] : Thank you very much for allowing me to participate in the care of this patient.  If you have any questions, please do not hesitate to contact me. [Sincerely,] : Sincerely, [FreeTextEntry2] : Judy Ramirez M.D. [address] [place], [place] [id number]    [FreeTextEntry3] :  Martita Estes MD  Otolaryngology, Head and Neck Surgery

## 2024-09-24 NOTE — CONSULT LETTER
[Dear  ___] : Dear  [unfilled], [Courtesy Letter:] : I had the pleasure of seeing your patient, [unfilled], in my office today. [Please see my note below.] : Please see my note below. [Consult Closing:] : Thank you very much for allowing me to participate in the care of this patient.  If you have any questions, please do not hesitate to contact me. [Sincerely,] : Sincerely, [FreeTextEntry2] : Judy Ramirez M.D. [address] [place], [place] [id number]    [FreeTextEntry3] :  Martita Estes MD  Otolaryngology, Head and Neck Surgery

## 2024-09-24 NOTE — HISTORY OF PRESENT ILLNESS
[de-identified] : Mr. AKERS reports that he lost 60 lbs and has less snoring.  CPAP pressure decreased from 20 to 12 cm H2O requirement. However, he only sleeps about 4 hours/night.  He thinks that the CPAP may need further adjustment.  He is looing for a new sleep medicine doctor. He still has congestion in left side nose when lying down.   Not using any nasal spray at this time.  Hx nasal polyps. S/p revision septoplasty, inferior turbinate reduction and endoscopic sinus surgery (maxillary, ethmoid, frontal) in 2016. S/p left inferior turbinate submucosal ablation for nightly congestion on 9/15/2021.

## 2024-09-24 NOTE — PROCEDURE
[FreeTextEntry6] : NASAL ENDOSCOPY: Indication: Nasal polyps -Verbal consent was obtained from patient prior to exam. - Oxymetazoline 0.05% and lidocaine 2% spray applied to nose bilaterally. Nasal endoscopy was performed with flexible scope. Findings: -- Inferior turbinate partially resected on the right; intact on left.   -- Septum was mildly deviated caudal to the left -- Middle turbinates normal bilateral -- Middle meatus clear bilateral.   -- Bilateral maxillary antrostomies and ethmoid cavities open. No d/c or polyps seen. -- Frontal sinusotomies seem patent also. -- Posterior choanae are clear. The patient tolerated the procedure well.

## 2024-10-11 ENCOUNTER — APPOINTMENT (OUTPATIENT)
Dept: PULMONOLOGY | Facility: CLINIC | Age: 59
End: 2024-10-11
Payer: MEDICARE

## 2024-10-11 ENCOUNTER — NON-APPOINTMENT (OUTPATIENT)
Age: 59
End: 2024-10-11

## 2024-10-11 VITALS
TEMPERATURE: 98.2 F | SYSTOLIC BLOOD PRESSURE: 121 MMHG | BODY MASS INDEX: 34.93 KG/M2 | WEIGHT: 244 LBS | HEART RATE: 114 BPM | RESPIRATION RATE: 13 BRPM | OXYGEN SATURATION: 99 % | HEIGHT: 70 IN | DIASTOLIC BLOOD PRESSURE: 83 MMHG

## 2024-10-11 DIAGNOSIS — F51.04 PSYCHOPHYSIOLOGIC INSOMNIA: ICD-10-CM

## 2024-10-11 DIAGNOSIS — G47.50 PARASOMNIA, UNSPECIFIED: ICD-10-CM

## 2024-10-11 DIAGNOSIS — G47.33 OBSTRUCTIVE SLEEP APNEA (ADULT) (PEDIATRIC): ICD-10-CM

## 2024-10-11 DIAGNOSIS — G47.22 CIRCADIAN RHYTHM SLEEP DISORDER, ADVANCED SLEEP PHASE TYPE: ICD-10-CM

## 2024-10-11 PROCEDURE — 99205 OFFICE O/P NEW HI 60 MIN: CPT

## 2024-10-11 PROCEDURE — G2211 COMPLEX E/M VISIT ADD ON: CPT

## 2024-10-12 PROBLEM — G47.33 OBSTRUCTIVE SLEEP APNEA, ADULT: Status: ACTIVE | Noted: 2024-10-11

## 2024-10-17 ENCOUNTER — APPOINTMENT (OUTPATIENT)
Dept: OTOLARYNGOLOGY | Facility: HOME HEALTH | Age: 59
End: 2024-10-17
Payer: MEDICARE

## 2024-10-17 DIAGNOSIS — G47.33 OBSTRUCTIVE SLEEP APNEA (ADULT) (PEDIATRIC): ICD-10-CM

## 2024-10-17 DIAGNOSIS — J34.3 HYPERTROPHY OF NASAL TURBINATES: ICD-10-CM

## 2024-10-17 DIAGNOSIS — E66.9 OBESITY, UNSPECIFIED: ICD-10-CM

## 2024-10-17 PROCEDURE — 99442: CPT | Mod: 93

## 2024-11-04 ENCOUNTER — OUTPATIENT (OUTPATIENT)
Dept: OUTPATIENT SERVICES | Facility: HOSPITAL | Age: 59
LOS: 1 days | End: 2024-11-04
Payer: MEDICARE

## 2024-11-04 ENCOUNTER — APPOINTMENT (OUTPATIENT)
Dept: SLEEP CENTER | Facility: HOSPITAL | Age: 59
End: 2024-11-04

## 2024-11-04 DIAGNOSIS — Z41.9 ENCOUNTER FOR PROCEDURE FOR PURPOSES OTHER THAN REMEDYING HEALTH STATE, UNSPECIFIED: Chronic | ICD-10-CM

## 2024-11-04 DIAGNOSIS — G47.33 OBSTRUCTIVE SLEEP APNEA (ADULT) (PEDIATRIC): ICD-10-CM

## 2024-11-04 DIAGNOSIS — Z96.651 PRESENCE OF RIGHT ARTIFICIAL KNEE JOINT: Chronic | ICD-10-CM

## 2024-11-04 PROCEDURE — 95810 POLYSOM 6/> YRS 4/> PARAM: CPT | Mod: 26

## 2024-11-20 PROCEDURE — 95810 POLYSOM 6/> YRS 4/> PARAM: CPT

## 2024-11-22 ENCOUNTER — APPOINTMENT (OUTPATIENT)
Dept: PULMONOLOGY | Facility: CLINIC | Age: 59
End: 2024-11-22
Payer: MEDICARE

## 2024-11-22 DIAGNOSIS — F51.04 PSYCHOPHYSIOLOGIC INSOMNIA: ICD-10-CM

## 2024-11-22 DIAGNOSIS — G47.52 REM SLEEP BEHAVIOR DISORDER: ICD-10-CM

## 2024-11-22 DIAGNOSIS — G47.33 OBSTRUCTIVE SLEEP APNEA (ADULT) (PEDIATRIC): ICD-10-CM

## 2024-11-22 PROCEDURE — 99442: CPT | Mod: 93

## 2024-11-22 RX ORDER — GLUCOSAMINE/MSM/CHONDROIT SULF 500-166.6
10 TABLET ORAL DAILY
Qty: 30 | Refills: 0 | Status: ACTIVE | COMMUNITY
Start: 2024-11-22 | End: 1900-01-01

## 2024-11-27 ENCOUNTER — NON-APPOINTMENT (OUTPATIENT)
Age: 59
End: 2024-11-27

## 2024-11-27 ENCOUNTER — APPOINTMENT (OUTPATIENT)
Dept: OTOLARYNGOLOGY | Facility: CLINIC | Age: 59
End: 2024-11-27

## 2025-01-03 ENCOUNTER — APPOINTMENT (OUTPATIENT)
Dept: PULMONOLOGY | Facility: CLINIC | Age: 60
End: 2025-01-03

## 2025-01-03 DIAGNOSIS — G47.33 OBSTRUCTIVE SLEEP APNEA (ADULT) (PEDIATRIC): ICD-10-CM

## 2025-01-03 DIAGNOSIS — G47.52 REM SLEEP BEHAVIOR DISORDER: ICD-10-CM

## 2025-01-03 PROCEDURE — 99213 OFFICE O/P EST LOW 20 MIN: CPT

## 2025-03-07 ENCOUNTER — APPOINTMENT (OUTPATIENT)
Dept: OTOLARYNGOLOGY | Facility: CLINIC | Age: 60
End: 2025-03-07

## 2025-03-17 ENCOUNTER — APPOINTMENT (OUTPATIENT)
Dept: PULMONOLOGY | Facility: CLINIC | Age: 60
End: 2025-03-17

## 2025-03-17 ENCOUNTER — APPOINTMENT (OUTPATIENT)
Dept: OTOLARYNGOLOGY | Facility: CLINIC | Age: 60
End: 2025-03-17
Payer: MEDICARE

## 2025-03-17 VITALS
HEIGHT: 70 IN | DIASTOLIC BLOOD PRESSURE: 82 MMHG | WEIGHT: 230 LBS | BODY MASS INDEX: 32.93 KG/M2 | HEART RATE: 78 BPM | SYSTOLIC BLOOD PRESSURE: 118 MMHG

## 2025-03-17 DIAGNOSIS — R05.3 CHRONIC COUGH: ICD-10-CM

## 2025-03-17 DIAGNOSIS — J32.8 OTHER CHRONIC SINUSITIS: ICD-10-CM

## 2025-03-17 DIAGNOSIS — J34.89 OTHER SPECIFIED DISORDERS OF NOSE AND NASAL SINUSES: ICD-10-CM

## 2025-03-17 PROCEDURE — 99214 OFFICE O/P EST MOD 30 MIN: CPT | Mod: 25

## 2025-03-17 PROCEDURE — 31231 NASAL ENDOSCOPY DX: CPT

## 2025-03-17 RX ORDER — PREDNISONE 10 MG/1
10 TABLET ORAL
Qty: 10 | Refills: 0 | Status: COMPLETED | COMMUNITY
Start: 2025-03-04

## 2025-03-17 RX ORDER — PREGABALIN 100 MG/1
100 CAPSULE ORAL
Qty: 60 | Refills: 0 | Status: COMPLETED | COMMUNITY
Start: 2025-02-07

## 2025-03-17 RX ORDER — TADALAFIL 20 MG/1
20 TABLET ORAL
Qty: 30 | Refills: 0 | Status: ACTIVE | COMMUNITY
Start: 2025-02-20

## 2025-03-17 RX ORDER — AZITHROMYCIN 250 MG/1
250 TABLET, FILM COATED ORAL
Qty: 6 | Refills: 0 | Status: COMPLETED | COMMUNITY
Start: 2025-03-04

## 2025-03-17 RX ORDER — PREGABALIN 150 MG/1
150 CAPSULE ORAL
Qty: 60 | Refills: 0 | Status: ACTIVE | COMMUNITY
Start: 2025-03-07

## 2025-03-17 RX ORDER — TIRZEPATIDE 12.5 MG/.5ML
12.5 INJECTION, SOLUTION SUBCUTANEOUS
Qty: 6 | Refills: 0 | Status: COMPLETED | COMMUNITY
Start: 2024-10-22

## 2025-03-17 RX ORDER — IPRATROPIUM BROMIDE 42 UG/1
0.06 SPRAY, METERED NASAL TWICE DAILY
Qty: 1 | Refills: 5 | Status: ACTIVE | COMMUNITY
Start: 2025-03-17 | End: 1900-01-01

## 2025-03-17 RX ORDER — TIRZEPATIDE 15 MG/.5ML
15 INJECTION, SOLUTION SUBCUTANEOUS
Qty: 6 | Refills: 0 | Status: ACTIVE | COMMUNITY
Start: 2025-01-16

## 2025-03-27 ENCOUNTER — NON-APPOINTMENT (OUTPATIENT)
Age: 60
End: 2025-03-27

## 2025-03-27 LAB — BETA-2 TRANSFERRIN: NEGATIVE

## 2025-04-02 ENCOUNTER — NON-APPOINTMENT (OUTPATIENT)
Age: 60
End: 2025-04-02

## 2025-04-02 ENCOUNTER — APPOINTMENT (OUTPATIENT)
Dept: OTOLARYNGOLOGY | Facility: CLINIC | Age: 60
End: 2025-04-02

## 2025-04-02 VITALS
HEART RATE: 109 BPM | HEIGHT: 70 IN | TEMPERATURE: 97.8 F | BODY MASS INDEX: 33.5 KG/M2 | DIASTOLIC BLOOD PRESSURE: 74 MMHG | SYSTOLIC BLOOD PRESSURE: 116 MMHG | WEIGHT: 234 LBS

## 2025-04-02 PROCEDURE — 99213 OFFICE O/P EST LOW 20 MIN: CPT

## 2025-04-04 ENCOUNTER — APPOINTMENT (OUTPATIENT)
Dept: PULMONOLOGY | Facility: CLINIC | Age: 60
End: 2025-04-04
Payer: MEDICARE

## 2025-04-04 DIAGNOSIS — G47.33 OBSTRUCTIVE SLEEP APNEA (ADULT) (PEDIATRIC): ICD-10-CM

## 2025-04-04 DIAGNOSIS — G47.52 REM SLEEP BEHAVIOR DISORDER: ICD-10-CM

## 2025-04-04 PROCEDURE — 99214 OFFICE O/P EST MOD 30 MIN: CPT | Mod: 2W

## 2025-04-04 PROCEDURE — G2211 COMPLEX E/M VISIT ADD ON: CPT | Mod: 2W

## 2025-04-24 PROBLEM — R09.82 POSTNASAL DRIP: Status: ACTIVE | Noted: 2025-04-24

## 2025-04-28 ENCOUNTER — RX RENEWAL (OUTPATIENT)
Age: 60
End: 2025-04-28

## 2025-05-02 ENCOUNTER — APPOINTMENT (OUTPATIENT)
Dept: OTOLARYNGOLOGY | Facility: CLINIC | Age: 60
End: 2025-05-02
Payer: MEDICARE

## 2025-05-02 VITALS
TEMPERATURE: 98.3 F | BODY MASS INDEX: 33.64 KG/M2 | OXYGEN SATURATION: 98 % | HEIGHT: 70 IN | SYSTOLIC BLOOD PRESSURE: 109 MMHG | DIASTOLIC BLOOD PRESSURE: 71 MMHG | HEART RATE: 79 BPM | WEIGHT: 235 LBS

## 2025-05-02 DIAGNOSIS — G47.33 OBSTRUCTIVE SLEEP APNEA (ADULT) (PEDIATRIC): ICD-10-CM

## 2025-05-02 DIAGNOSIS — Z87.09 PERSONAL HISTORY OF OTHER DISEASES OF THE RESPIRATORY SYSTEM: ICD-10-CM

## 2025-05-02 DIAGNOSIS — J30.89 OTHER ALLERGIC RHINITIS: ICD-10-CM

## 2025-05-02 DIAGNOSIS — J34.3 HYPERTROPHY OF NASAL TURBINATES: ICD-10-CM

## 2025-05-02 PROCEDURE — 99214 OFFICE O/P EST MOD 30 MIN: CPT | Mod: 25

## 2025-05-02 PROCEDURE — 31231 NASAL ENDOSCOPY DX: CPT

## 2025-05-02 RX ORDER — AZELASTINE HYDROCHLORIDE AND FLUTICASONE PROPIONATE 137; 50 UG/1; UG/1
137-50 SPRAY, METERED NASAL
Qty: 3 | Refills: 3 | Status: ACTIVE | COMMUNITY
Start: 2025-05-02 | End: 1900-01-01

## 2025-05-09 ENCOUNTER — APPOINTMENT (OUTPATIENT)
Dept: PULMONOLOGY | Facility: CLINIC | Age: 60
End: 2025-05-09